# Patient Record
Sex: MALE | Race: BLACK OR AFRICAN AMERICAN | NOT HISPANIC OR LATINO
[De-identification: names, ages, dates, MRNs, and addresses within clinical notes are randomized per-mention and may not be internally consistent; named-entity substitution may affect disease eponyms.]

---

## 2021-01-25 ENCOUNTER — NON-APPOINTMENT (OUTPATIENT)
Age: 51
End: 2021-01-25

## 2021-01-25 ENCOUNTER — APPOINTMENT (OUTPATIENT)
Dept: HEART AND VASCULAR | Facility: CLINIC | Age: 51
End: 2021-01-25
Payer: MEDICARE

## 2021-01-25 VITALS
DIASTOLIC BLOOD PRESSURE: 100 MMHG | WEIGHT: 165 LBS | SYSTOLIC BLOOD PRESSURE: 170 MMHG | HEIGHT: 76 IN | HEART RATE: 97 BPM | BODY MASS INDEX: 20.09 KG/M2

## 2021-01-25 DIAGNOSIS — Z82.49 FAMILY HISTORY OF ISCHEMIC HEART DISEASE AND OTHER DISEASES OF THE CIRCULATORY SYSTEM: ICD-10-CM

## 2021-01-25 PROCEDURE — 93306 TTE W/DOPPLER COMPLETE: CPT

## 2021-01-25 PROCEDURE — 93000 ELECTROCARDIOGRAM COMPLETE: CPT

## 2021-01-25 PROCEDURE — 93880 EXTRACRANIAL BILAT STUDY: CPT

## 2021-01-25 PROCEDURE — 99205 OFFICE O/P NEW HI 60 MIN: CPT

## 2021-01-25 NOTE — PHYSICAL EXAM
[Normal Conjunctiva] : the conjunctiva exhibited no abnormalities [Eyelids - No Xanthelasma] : the eyelids demonstrated no xanthelasmas [Normal Oral Mucosa] : normal oral mucosa [No Oral Pallor] : no oral pallor [No Oral Cyanosis] : no oral cyanosis [Normal Jugular Venous A Waves Present] : normal jugular venous A waves present [Normal Jugular Venous V Waves Present] : normal jugular venous V waves present [No Jugular Venous Good A Waves] : no jugular venous good A waves [Respiration, Rhythm And Depth] : normal respiratory rhythm and effort [Exaggerated Use Of Accessory Muscles For Inspiration] : no accessory muscle use [Auscultation Breath Sounds / Voice Sounds] : lungs were clear to auscultation bilaterally [Shifted Left] : shifted to the left and inferior [Premature Beats] : regular with premature beats [Normal S1] : normal S1 [Normal S2] : normal S2 [S4] : an S4 was heard [III] : a grade 3 [Right Carotid Bruit] : right carotid bruit heard [No Pitting Edema] : no pitting edema present [Left Carotid Bruit] : left carotid bruit heard [Abdomen Soft] : soft [Abdomen Tenderness] : non-tender [] : no hepato-splenomegaly [Abdomen Mass (___ Cm)] : no abdominal mass palpated [Abnormal Walk] : normal gait [Gait - Sufficient For Exercise Testing] : the gait was sufficient for exercise testing [Click] : no click [Distant] : the heart sounds were ~L not distant [Pericardial Rub] : no pericardial rub [FreeTextEntry1] : clotted AVF rue  with ectatic vessels in RUE

## 2021-01-25 NOTE — ASSESSMENT
[FreeTextEntry1] : Assessment \par Patient presents with accelerated hypertension blood pressure 170/100 echocardiogram is notable for depressed LV function with an EF of about 35% with global hypokinesis and moderate LVH.  Moderate diastolic dysfunction is also noted along with moderate mitral regurgitation and moderate pulmonary hypertension he has significant calcifications noted of the aortic leaflets as well as focal calcifications noted on the mitral leaflets.  To optimize his blood pressure Norvasc 5 mg was added to help lower his systolic and diastolic numbers which hopefully will improve LV function.  He is not in any decompensated heart failure based on examination today.  He is cleared to undergo placement of the AV fistula as long-term placement of a catheter in the femoral vein presents significant risk of future infections and risks and benefits outweigh waiting at this point.  I will attempt to find his prior cardiac evaluation to see if there is any ischemic disease patient also might be a candidate for a future defibrillator if indeed this cardiomyopathy is a chronic finding.

## 2021-01-25 NOTE — REVIEW OF SYSTEMS
[Feeling Fatigued] : feeling fatigued [Shortness Of Breath] : shortness of breath [Dyspnea on exertion] : dyspnea during exertion [Negative] : Neurological

## 2021-01-25 NOTE — DISCUSSION/SUMMARY
[Procedure Low Risk] : the procedure risk is low [Patient Intermediate Risk] : the patient is an intermediate risk [Optimized for Surgery] : the patient is optimized for surgery [FreeTextEntry1] : stop eliquis preop 2 days

## 2021-01-25 NOTE — HISTORY OF PRESENT ILLNESS
[Preoperative Visit] : for a medical evaluation prior to surgery [Scheduled Procedure ___] : a [unfilled] [Surgeon Name ___] : surgeon: [unfilled] [Good] : Good [Cardiovascular Disease] : cardiovascular disease [Renal Disease] : renal disease [Prior Anesthesia] : Prior anesthesia [Electrocardiogram] : ~T an ECG ~C was performed [Echocardiogram] : ~T an echocardiogram ~C was performed [Fever] : no fever [Chills] : no chills [Chest Pain] : no chest pain [Diabetes] : no diabetes [Prev Anesthesia Reaction] : no previous anesthesia reaction [de-identified] : Jeevan [FreeTextEntry1] : Patient is a 50-year-old black male with a longstanding hypertensive disease which likely cause renal failure and has been on dialysis for at least 6 years.  Patient had an AV fistula in his right arm which recently clotted off and a catheter was put in the left femoral vein.  He is now preop for AV fistula insertion in the left arm.  Recent cardiac history is notable for admission approximately 1 year ago to [facility] for the patient describes congestive heart failure with difficulty breathing and edema.  He was compliant with dialysis treatment at that time he was placed on aggressive hypertensive meds at that time which appears to have helped him.  He still has some degree of dyspnea with climbing stairs but cannot tolerate walking on flat ground with no overt limitations.\par Patient reports that he did have a cardiac evaluation approximately 2 years ago when he was placed on a transplant list which included a stress test which by his report revealed no blockages.

## 2021-02-08 ENCOUNTER — APPOINTMENT (OUTPATIENT)
Dept: HEART AND VASCULAR | Facility: CLINIC | Age: 51
End: 2021-02-08
Payer: MEDICARE

## 2021-02-08 VITALS
BODY MASS INDEX: 20.09 KG/M2 | HEIGHT: 76 IN | SYSTOLIC BLOOD PRESSURE: 180 MMHG | DIASTOLIC BLOOD PRESSURE: 110 MMHG | WEIGHT: 165 LBS

## 2021-02-08 PROCEDURE — 99214 OFFICE O/P EST MOD 30 MIN: CPT

## 2021-02-08 NOTE — ASSESSMENT
[FreeTextEntry1] : Assessment \par Will optimize BP with higher dose on norvasc \par 10 mg \par will consider MRI angio as pt has prior dye allergy described as anaphylaxis by renal

## 2021-02-08 NOTE — PHYSICAL EXAM
[General Appearance - Well Developed] : well developed [Normal Appearance] : normal appearance [Well Groomed] : well groomed [General Appearance - Well Nourished] : well nourished [No Deformities] : no deformities [General Appearance - In No Acute Distress] : no acute distress [Normal Conjunctiva] : the conjunctiva exhibited no abnormalities [Eyelids - No Xanthelasma] : the eyelids demonstrated no xanthelasmas [Normal Oral Mucosa] : normal oral mucosa [No Oral Pallor] : no oral pallor [No Oral Cyanosis] : no oral cyanosis [Normal Jugular Venous A Waves Present] : normal jugular venous A waves present [Normal Jugular Venous V Waves Present] : normal jugular venous V waves present [No Jugular Venous Good A Waves] : no jugular venous good A waves [Rhythm Regular] : regular [Normal S1] : normal S1 [Normal S2] : normal S2 [S4] : an S4 was heard [Click] : no click [Distant] : the heart sounds were ~L not distant

## 2021-02-08 NOTE — HISTORY OF PRESENT ILLNESS
[FreeTextEntry1] : AVF psotponed due to weather \par Had prior nuclear stress in 2019 \par segmental ischemia \par Was placed on med sfor chf and bp and ICD deferred as EF improved \par Still has elev BP on non HD days

## 2021-03-08 ENCOUNTER — NON-APPOINTMENT (OUTPATIENT)
Age: 51
End: 2021-03-08

## 2021-03-08 ENCOUNTER — APPOINTMENT (OUTPATIENT)
Dept: HEART AND VASCULAR | Facility: CLINIC | Age: 51
End: 2021-03-08
Payer: MEDICARE

## 2021-03-08 VITALS
HEIGHT: 76 IN | BODY MASS INDEX: 20.09 KG/M2 | SYSTOLIC BLOOD PRESSURE: 170 MMHG | DIASTOLIC BLOOD PRESSURE: 90 MMHG | WEIGHT: 165 LBS | HEART RATE: 77 BPM

## 2021-03-08 PROCEDURE — 99215 OFFICE O/P EST HI 40 MIN: CPT

## 2021-03-08 PROCEDURE — 93000 ELECTROCARDIOGRAM COMPLETE: CPT

## 2021-03-08 NOTE — HISTORY OF PRESENT ILLNESS
[FreeTextEntry1] : AVF placed by Jeevan \par Had prior nuclear stress in 2019 \par segmental ischemia \par Was placed on med for chf and bp and ICD deferred as EF improved \par Still has elev BP on non HD days \par feels well with no sscp or sob \par Has scant UO

## 2021-03-08 NOTE — PHYSICAL EXAM
[General Appearance - Well Developed] : well developed [Normal Appearance] : normal appearance [Well Groomed] : well groomed [General Appearance - Well Nourished] : well nourished [No Deformities] : no deformities [General Appearance - In No Acute Distress] : no acute distress [Normal Conjunctiva] : the conjunctiva exhibited no abnormalities [Eyelids - No Xanthelasma] : the eyelids demonstrated no xanthelasmas [Normal Oral Mucosa] : normal oral mucosa [No Oral Pallor] : no oral pallor [No Oral Cyanosis] : no oral cyanosis [Normal Jugular Venous A Waves Present] : normal jugular venous A waves present [Normal Jugular Venous V Waves Present] : normal jugular venous V waves present [No Jugular Venous Good A Waves] : no jugular venous good A waves [FreeTextEntry1] : pos thrill LUE  [Rhythm Regular] : regular [Normal S1] : normal S1 [Normal S2] : normal S2 [S4] : an S4 was heard [Click] : no click

## 2021-03-08 NOTE — ASSESSMENT
[FreeTextEntry1] : Assessment \par Bp improved on meds \par Has Cardiomyopathywhich based on prior ETT NUCLEAR is ischemic in nature \par Pt had dye reaction in past and will be in touch with Dr Horton about doing a cath \par Bp remains stable \par risk benefits explained

## 2021-04-12 ENCOUNTER — APPOINTMENT (OUTPATIENT)
Dept: HEART AND VASCULAR | Facility: CLINIC | Age: 51
End: 2021-04-12
Payer: MEDICARE

## 2021-04-12 ENCOUNTER — NON-APPOINTMENT (OUTPATIENT)
Age: 51
End: 2021-04-12

## 2021-04-12 VITALS
WEIGHT: 158 LBS | BODY MASS INDEX: 19.24 KG/M2 | DIASTOLIC BLOOD PRESSURE: 80 MMHG | HEIGHT: 76 IN | SYSTOLIC BLOOD PRESSURE: 170 MMHG | HEART RATE: 71 BPM

## 2021-04-12 PROCEDURE — 93000 ELECTROCARDIOGRAM COMPLETE: CPT

## 2021-04-12 PROCEDURE — 99213 OFFICE O/P EST LOW 20 MIN: CPT

## 2021-04-12 NOTE — HISTORY OF PRESENT ILLNESS
[FreeTextEntry1] : AVF placed by Jeevan \par Getting HD via Fistula \par No sscp during HD \par Has Bp drop on HD to 130/80 \par No dizziness

## 2021-07-12 ENCOUNTER — APPOINTMENT (OUTPATIENT)
Dept: HEART AND VASCULAR | Facility: CLINIC | Age: 51
End: 2021-07-12
Payer: MEDICARE

## 2021-07-12 ENCOUNTER — NON-APPOINTMENT (OUTPATIENT)
Age: 51
End: 2021-07-12

## 2021-07-12 VITALS
HEIGHT: 76 IN | SYSTOLIC BLOOD PRESSURE: 140 MMHG | WEIGHT: 157 LBS | HEART RATE: 75 BPM | BODY MASS INDEX: 19.12 KG/M2 | DIASTOLIC BLOOD PRESSURE: 90 MMHG

## 2021-07-12 VITALS — SYSTOLIC BLOOD PRESSURE: 150 MMHG | DIASTOLIC BLOOD PRESSURE: 90 MMHG

## 2021-07-12 PROCEDURE — 99214 OFFICE O/P EST MOD 30 MIN: CPT

## 2021-07-12 PROCEDURE — 93000 ELECTROCARDIOGRAM COMPLETE: CPT

## 2021-07-12 NOTE — HISTORY OF PRESENT ILLNESS
[Preoperative Visit] : for a medical evaluation prior to surgery [Scheduled Procedure ___] : a [unfilled] [Surgeon Name ___] : surgeon: [unfilled] [de-identified] : Dr Chew [FreeTextEntry1] : Patient is a 51-year-old male with end-stage renal disease currently on dialysis treatment patient also has very underlying cardiomyopathy for which he been followed in this office he is currently preop for a right and left arm fistula plasty due to impaired function of his fistula.  He currently tolerates fairly good effort tolerance able to walk 20 to 30minutes with no limitations.  He has no symptoms of orthopnea no unexplained headaches appetite is good.

## 2021-07-12 NOTE — REVIEW OF SYSTEMS
[Fever] : no fever [Headache] : no headache [Weight Gain (___ Lbs)] : no recent weight gain [Feeling Fatigued] : not feeling fatigued [Weight Loss (___ Lbs)] : no recent weight loss [SOB] : no shortness of breath [Leg Claudication] : no intermittent leg claudication [Negative] : Gastrointestinal

## 2021-07-12 NOTE — PHYSICAL EXAM
[Normal Conjunctiva] : the conjunctiva exhibited no abnormalities [Normal Oral Mucosa] : normal oral mucosa [Normal Jugular Venous A Waves Present] : normal jugular venous A waves present [Normal Jugular Venous V Waves Present] : normal jugular venous V waves present [Respiration, Rhythm And Depth] : normal respiratory rhythm and effort [Exaggerated Use Of Accessory Muscles For Inspiration] : no accessory muscle use [Normal Rate] : normal [Rhythm Regular] : regular [Normal S2] : normal S2 [S3] : an S3 was heard [Click] : no click [Distant] : the heart sounds were ~L not distant [II] : a grade 2 [Right Carotid Bruit] : right carotid bruit heard [Left Carotid Bruit] : left carotid bruit heard [FreeTextEntry1] : pos thrill left arm . vasc deformities deformities bilat UE  [Skin Color & Pigmentation] : normal skin color and pigmentation [] : no rash [No Venous Stasis] : no venous stasis [Skin Lesions] : no skin lesions [No Skin Ulcers] : no skin ulcer [No Xanthoma] : no  xanthoma was observed

## 2021-09-13 ENCOUNTER — NON-APPOINTMENT (OUTPATIENT)
Age: 51
End: 2021-09-13

## 2021-09-13 ENCOUNTER — APPOINTMENT (OUTPATIENT)
Dept: HEART AND VASCULAR | Facility: CLINIC | Age: 51
End: 2021-09-13
Payer: MEDICARE

## 2021-09-13 VITALS — HEIGHT: 76 IN | WEIGHT: 159.56 LBS | BODY MASS INDEX: 19.43 KG/M2 | HEART RATE: 75 BPM

## 2021-09-13 PROCEDURE — 99214 OFFICE O/P EST MOD 30 MIN: CPT

## 2021-09-13 PROCEDURE — 93000 ELECTROCARDIOGRAM COMPLETE: CPT

## 2021-09-13 NOTE — PHYSICAL EXAM
[5th Left ICS - MCL] : palpated at the 5th LICS in the midclavicular line [Normal] : normal [Normal Rate] : normal [Rhythm Regular] : regular [Normal S1] : normal S1 [Normal S2] : normal S2 [S4] : an S4 was heard [II] : a grade 2 [Right Carotid Bruit] : right carotid bruit heard [Left Carotid Bruit] : left carotid bruit heard [de-identified] : Pos thrill LUE

## 2021-09-13 NOTE — HISTORY OF PRESENT ILLNESS
[FreeTextEntry1] : walks up 3 flights of steps limited by VERNON \par no edema no sob \par Tolerating HD TIW \par Periodic dizziness after eating meaks no arrhythmias or near syncope \par Fistula in left arm functional

## 2021-09-13 NOTE — REVIEW OF SYSTEMS
[SOB] : shortness of breath [Negative] : Genitourinary [Fever] : no fever [Chills] : no chills [Leg Claudication] : no intermittent leg claudication [Syncope] : no syncope [Cough] : no cough

## 2021-09-13 NOTE — ASSESSMENT
[FreeTextEntry1] : CHF class II \par No edema on exam Bpstable on HD TIW \par Had abnl MIBI in 2019 and will f/u as he is on renal tx list \par regular exercise and diet reviewed

## 2021-10-06 ENCOUNTER — APPOINTMENT (OUTPATIENT)
Dept: HEART AND VASCULAR | Facility: CLINIC | Age: 51
End: 2021-10-06

## 2021-10-20 ENCOUNTER — APPOINTMENT (OUTPATIENT)
Dept: HEART AND VASCULAR | Facility: CLINIC | Age: 51
End: 2021-10-20
Payer: MEDICARE

## 2021-10-20 PROCEDURE — 96374 THER/PROPH/DIAG INJ IV PUSH: CPT | Mod: 59

## 2021-10-20 PROCEDURE — A9500: CPT

## 2021-10-20 PROCEDURE — 78452 HT MUSCLE IMAGE SPECT MULT: CPT

## 2021-10-20 PROCEDURE — 93015 CV STRESS TEST SUPVJ I&R: CPT

## 2022-02-14 ENCOUNTER — APPOINTMENT (OUTPATIENT)
Dept: HEART AND VASCULAR | Facility: CLINIC | Age: 52
End: 2022-02-14
Payer: MEDICARE

## 2022-02-14 ENCOUNTER — NON-APPOINTMENT (OUTPATIENT)
Age: 52
End: 2022-02-14

## 2022-02-14 VITALS — SYSTOLIC BLOOD PRESSURE: 125 MMHG | DIASTOLIC BLOOD PRESSURE: 70 MMHG

## 2022-02-14 PROCEDURE — 99214 OFFICE O/P EST MOD 30 MIN: CPT

## 2022-02-14 PROCEDURE — 93880 EXTRACRANIAL BILAT STUDY: CPT

## 2022-02-14 PROCEDURE — 93306 TTE W/DOPPLER COMPLETE: CPT

## 2022-02-14 PROCEDURE — 93000 ELECTROCARDIOGRAM COMPLETE: CPT

## 2022-02-14 NOTE — ASSESSMENT
[FreeTextEntry1] : CHF class II \par No edema on exam Bp stable on HD TIW \par Had abnl MIBI in 2019 and will f/u as he is on renal tx list \par regular exercise and diet reviewed \par Prior Nuclear mod scarring \par Ef 20%\par echo Lvef 20% mod MR poorly mobile PL MV \par Spontaneous echo contras \par Mild plaque in carotids \par pt is candidate for ICD \par risk benefits discussed \par Has declined in apst \par will speka to renal to discuss

## 2022-02-14 NOTE — PHYSICAL EXAM
[Shifted Left] : shifted to the left and inferior [Normal] : normal [Apical Thrill] : a thrill was present at the apex [Normal Rate] : normal [Rhythm Regular] : regular [Normal S1] : normal S1 [Normal S2] : normal S2 [S4] : an S4 was heard [II] : a grade 2 [I] : a grade 1 [Left Carotid Bruit] : left carotid bruit heard [Right Carotid Bruit] : right carotid bruit heard [de-identified] : Pos thrill LUE

## 2022-04-25 ENCOUNTER — RX RENEWAL (OUTPATIENT)
Age: 52
End: 2022-04-25

## 2022-05-09 ENCOUNTER — APPOINTMENT (OUTPATIENT)
Dept: HEART AND VASCULAR | Facility: CLINIC | Age: 52
End: 2022-05-09

## 2022-06-08 ENCOUNTER — NON-APPOINTMENT (OUTPATIENT)
Age: 52
End: 2022-06-08

## 2022-06-08 ENCOUNTER — APPOINTMENT (OUTPATIENT)
Dept: HEART AND VASCULAR | Facility: CLINIC | Age: 52
End: 2022-06-08
Payer: MEDICARE

## 2022-06-08 VITALS
HEART RATE: 78 BPM | SYSTOLIC BLOOD PRESSURE: 140 MMHG | BODY MASS INDEX: 19.48 KG/M2 | DIASTOLIC BLOOD PRESSURE: 85 MMHG | WEIGHT: 160 LBS | RESPIRATION RATE: 12 BRPM | HEIGHT: 76 IN

## 2022-06-08 PROCEDURE — 93000 ELECTROCARDIOGRAM COMPLETE: CPT

## 2022-06-08 PROCEDURE — 99214 OFFICE O/P EST MOD 30 MIN: CPT

## 2022-06-08 RX ORDER — APIXABAN 2.5 MG/1
2.5 TABLET, FILM COATED ORAL
Qty: 60 | Refills: 3 | Status: DISCONTINUED | COMMUNITY
End: 2022-06-08

## 2022-06-08 NOTE — HISTORY OF PRESENT ILLNESS
[Preoperative Visit] : for a medical evaluation prior to surgery [Scheduled Procedure ___] : a [unfilled] [Surgeon Name ___] : surgeon: [unfilled] [Dyspnea] : dyspnea [Cardiovascular Disease] : cardiovascular disease [Renal Disease] : renal disease [Prior Anesthesia] : Prior anesthesia [Electrocardiogram] : ~T an ECG ~C was performed [Echocardiogram] : ~T an echocardiogram ~C was performed [Metabolic Capacity ___Mets%] : The patient has a metabolic capacity of [unfilled] Mets%  [Fever] : no fever [Chills] : no chills [Fatigue] : no fatigue [Chest Pain] : no chest pain [Urinary Frequency] : no urinary frequency [Nausea] : no nausea [Diarrhea] : no diarrhea [Abdominal Pain] : no abdominal pain [GI Disease] : no gastrointestinal disease [Sleep Apnea] : no sleep apnea [Prev Anesthesia Reaction] : no previous anesthesia reaction [de-identified] : Dr Chew  [FreeTextEntry1] : Patient is well-known to me he is 52 years old currently on dialysis for end-stage renal disease he was also diagnosed with known cardiomyopathy prior perfusion studies abnl ? ischemic etiology  catheterization had not been performed due to history of dye allergy patient in the past has declined the use of a defibrillator.  Patient tolerates dialysis treatments fairly well he does note some degree of dyspnea when carrying bags upstairs requiring him to rest is no signs of peripheral edema he is currently preop for an AV fistula balloon repair in the left arm he is currently getting dialyzed via a right Shiley catheter.

## 2022-06-08 NOTE — REVIEW OF SYSTEMS
[SOB] : shortness of breath [Dyspnea on exertion] : dyspnea during exertion [Chest Discomfort] : no chest discomfort [Lower Ext Edema] : no extremity edema [Palpitations] : no palpitations [Orthopnea] : no orthopnea [Cough] : no cough [Wheezing] : no wheezing

## 2022-06-08 NOTE — PHYSICAL EXAM
[Normal Conjunctiva] : the conjunctiva exhibited no abnormalities [Normal Oral Mucosa] : normal oral mucosa [] : no respiratory distress [Exaggerated Use Of Accessory Muscles For Inspiration] : no accessory muscle use [Normal] : normal [Rhythm Regular] : regular [Normal S1] : normal S1 [Normal S2] : normal S2 [Click] : a ~M click was heard [III] : a grade 3 [Oriented To Time, Place, And Person] : oriented to person, place, and time [Impaired Insight] : insight and judgment were intact [Distant] : the heart sounds were ~L not distant [Right Carotid Bruit] : no bruit heard over the right carotid [Left Carotid Bruit] : no bruit heard over the left carotid [FreeTextEntry1] : avf fistula PAT barrett in right side in situ

## 2022-06-08 NOTE — DISCUSSION/SUMMARY
[Procedure Low Risk] : the procedure risk is low [Patient Low Risk] : the patient is a low surgical risk [Optimized for Surgery] : the patient is optimized for surgery [FreeTextEntry1] : was given elioquis by vas will hold as per vasc instructions

## 2022-10-31 ENCOUNTER — APPOINTMENT (OUTPATIENT)
Dept: HEART AND VASCULAR | Facility: CLINIC | Age: 52
End: 2022-10-31
Payer: MEDICARE

## 2022-10-31 ENCOUNTER — NON-APPOINTMENT (OUTPATIENT)
Age: 52
End: 2022-10-31

## 2022-10-31 VITALS
WEIGHT: 160 LBS | BODY MASS INDEX: 19.48 KG/M2 | DIASTOLIC BLOOD PRESSURE: 80 MMHG | HEIGHT: 76 IN | SYSTOLIC BLOOD PRESSURE: 130 MMHG | HEART RATE: 84 BPM | RESPIRATION RATE: 12 BRPM

## 2022-10-31 DIAGNOSIS — I49.9 CARDIAC ARRHYTHMIA, UNSPECIFIED: ICD-10-CM

## 2022-10-31 PROCEDURE — 93000 ELECTROCARDIOGRAM COMPLETE: CPT

## 2022-10-31 PROCEDURE — 99214 OFFICE O/P EST MOD 30 MIN: CPT | Mod: 25

## 2022-10-31 NOTE — HISTORY OF PRESENT ILLNESS
[FreeTextEntry1] : Patient is a 52-year-old male with a end-stage cardiomyopathy currently on hemodialysis for end-stage renal disease.  Approximately 2 months ago patient has noted nocturnal episodes of palpitations lasting typically less than a minute associated with some mild dizziness.  Patient does note that after finishing dialysis his heart rate is typically on the low side usually 35-45 with some symptoms of lightheadedness and dizziness.  He denies any current symptoms of orthopnea PND or leg edema.

## 2022-10-31 NOTE — ASSESSMENT
[FreeTextEntry1] : Arrhythmias\par Wiil increase BB to 25 mg bid of coreeg \par lower CCB norvasc to 5mg \par qd\par Holter placed

## 2022-10-31 NOTE — DISCUSSION/SUMMARY
[Rhythm Disorder] : rhythm disorder [Not Responding to Treatment] : not responding to treatment [Holter Monitor] : a Holter monitor [Increase] : increasing beta blockers [EKG obtained to assist in diagnosis and management of assessed problem(s)] : EKG obtained to assist in diagnosis and management of assessed problem(s)

## 2022-10-31 NOTE — PHYSICAL EXAM
[Frail] : frail [Elevated JVD ____cm] : elevated JVD ~Vcm [No Edema] : no edema [No Cyanosis] : no cyanosis [de-identified] : apical 4/6 sys murmur to axilla  [de-identified] : Left avf w thrill shihuy in situ

## 2022-11-07 ENCOUNTER — APPOINTMENT (OUTPATIENT)
Dept: HEART AND VASCULAR | Facility: CLINIC | Age: 52
End: 2022-11-07

## 2022-11-07 PROCEDURE — 99213 OFFICE O/P EST LOW 20 MIN: CPT

## 2022-11-14 NOTE — HISTORY OF PRESENT ILLNESS
[FreeTextEntry1] : Patient is a 52-year-old black male with end-stage renal disease currently on hemodialysis via Shiley catheter who has a known history of a cardiomyopathy based on prior echoes and stress imaging.  Patient has reported recent episodes of near syncope after dialysis.  Holter monitoring was placed and revealed a high degree AV block especially in the morning hours with prolonged AV block lasting greater than 7.5 seconds they were's intermittent episodes of none sustained V. tach as well as SVT noted these episodes occur during the morning hours and the patient was presumably sleeping and had no symptoms of dizziness or syncope.  Patient had been urged in the past to get a defibrillator for his reduced LV function but has declined.

## 2022-11-14 NOTE — DISCUSSION/SUMMARY
[Cardiomyopathy] : cardiomyopathy [ICD] : an implantable cardioverter-defibrillator [Pacemaker] : a pacemaker [Patient] : the patient

## 2022-11-14 NOTE — ASSESSMENT
[FreeTextEntry1] : Has high degee AVB on holet > 7 sec pauses \par Will refer for ICD PM w Known EF < 30

## 2022-11-14 NOTE — REVIEW OF SYSTEMS
[Headache] : no headache [Weight Gain (___ Lbs)] : no recent weight gain [Feeling Fatigued] : feeling fatigued [Weight Loss (___ Lbs)] : no recent weight loss [SOB] : no shortness of breath

## 2022-11-14 NOTE — PHYSICAL EXAM
[5th Left ICS - MCL] : palpated at the 5th LICS in the midclavicular line [Rhythm Regular] : regular [Normal S1] : normal S1 [Normal S2] : normal S2 [II] : a grade 2 [Normal] : soft, non-tender, no masses/organomegaly, normal bowel sounds [de-identified] : LUE / JANESSA mod varicosities bilat

## 2022-12-16 ENCOUNTER — NON-APPOINTMENT (OUTPATIENT)
Age: 52
End: 2022-12-16

## 2022-12-16 ENCOUNTER — APPOINTMENT (OUTPATIENT)
Dept: HEART AND VASCULAR | Facility: CLINIC | Age: 52
End: 2022-12-16

## 2022-12-16 VITALS
BODY MASS INDEX: 18.63 KG/M2 | HEIGHT: 76 IN | SYSTOLIC BLOOD PRESSURE: 148 MMHG | WEIGHT: 153 LBS | HEART RATE: 74 BPM | DIASTOLIC BLOOD PRESSURE: 90 MMHG

## 2022-12-16 PROCEDURE — 93000 ELECTROCARDIOGRAM COMPLETE: CPT

## 2022-12-16 PROCEDURE — 99214 OFFICE O/P EST MOD 30 MIN: CPT | Mod: 25

## 2022-12-16 RX ORDER — ENALAPRIL MALEATE 20 MG/1
20 TABLET ORAL DAILY
Qty: 90 | Refills: 0 | Status: DISCONTINUED | COMMUNITY
End: 2022-12-16

## 2022-12-16 NOTE — PHYSICAL EXAM
[Well Developed] : well developed [Well Nourished] : well nourished [No Acute Distress] : no acute distress [Normal Conjunctiva] : normal conjunctiva [Normal Venous Pressure] : normal venous pressure [No Carotid Bruit] : no carotid bruit [Clear Lung Fields] : clear lung fields [Good Air Entry] : good air entry [No Respiratory Distress] : no respiratory distress  [Soft] : abdomen soft [Non Tender] : non-tender [No Masses/organomegaly] : no masses/organomegaly [Normal Bowel Sounds] : normal bowel sounds [Normal Gait] : normal gait [No Rash] : no rash [No Skin Lesions] : no skin lesions [Moves all extremities] : moves all extremities [No Focal Deficits] : no focal deficits [Normal Speech] : normal speech [Alert and Oriented] : alert and oriented [Normal memory] : normal memory [de-identified] : Cardiovascular: The PMI was palpated at the 5th LICS in the midclavicular line. The apical impulse was normal. The rhythm was regular. Heart sounds: normal S1, normal S2. \par A grade 2 systolic murmur was heard at the LLSB. [de-identified] : LUE / RUE mod varicosities bilat.

## 2022-12-16 NOTE — ASSESSMENT
[FreeTextEntry1] : Third degree atrioventricular block (426.0) (I44.2)\par CHF (congestive heart failure) (428.0) (I50.9)\par \par Has high degee AVB on holet > 7 sec pauses \par Will refer for ICD PM w Known EF < 30. \par Will get ICD (leadless and micra PPM) \par has dyspnea w walking will change ACE for entresto bid

## 2022-12-16 NOTE — HISTORY OF PRESENT ILLNESS
[FreeTextEntry1] : 11/7/22\par Patient is a 52-year-old black male with end-stage renal disease currently on hemodialysis via Shiley catheter who has a known history of a cardiomyopathy based on prior echoes and stress imaging. Patient has reported recent episodes of near syncope after dialysis. Holter monitoring was placed and revealed a high degree AV block especially in the morning hours with prolonged AV block lasting greater than 7.5 seconds they were's intermittent episodes of none sustained V. tach as well as SVT noted these episodes occur during the morning hours and the patient was presumably sleeping and had no symptoms of dizziness or syncope. Patient had been urged in the past to get a defibrillator for his reduced LV function but has declined. \par 12/16/22\par The patient c/o SOB for the past 3 to 4 days, described as mild in severity, worsening with exertion and relieved with rest. The patient denies associated chest pain, orthopnea, palpitations, leg edema, dizziness, diaphoresis, PND. The patient considers this a change in their clinical condition/status.\par seen  by EPS will get leadless ICD followed by micra ppm in RV

## 2022-12-16 NOTE — DISCUSSION/SUMMARY
[Cardiomyopathy] : cardiomyopathy [ECG] : ECG [Continue] : continuing angiotensin receptor blockers [ICD] : an implantable cardioverter-defibrillator [Pacemaker] : a pacemaker [Patient] : the patient [de-identified] : class II  [de-identified] : entresto added instaed o ACE  [FreeTextEntry1] : Cardiomyopathy: The impression is cardiomyopathy. Other planned treatment includes an implantable cardioverter-defibrillator and a pacemaker. The plan was discussed with the patient.  [EKG obtained to assist in diagnosis and management of assessed problem(s)] : EKG obtained to assist in diagnosis and management of assessed problem(s)

## 2022-12-16 NOTE — ADDENDUM
[FreeTextEntry1] : I, Chris Elmore, assisted in documentation on 12/16/2022 acting as a scribe for Dr. Enrico Cortez.\par \par

## 2022-12-16 NOTE — REVIEW OF SYSTEMS
[Feeling Fatigued] : feeling fatigued [Negative] : Heme/Lymph [Headache] : no headache [Weight Gain (___ Lbs)] : no recent weight gain [Weight Loss (___ Lbs)] : no recent weight loss

## 2023-01-27 ENCOUNTER — APPOINTMENT (OUTPATIENT)
Dept: HEART AND VASCULAR | Facility: CLINIC | Age: 53
End: 2023-01-27
Payer: MEDICARE

## 2023-01-27 ENCOUNTER — NON-APPOINTMENT (OUTPATIENT)
Age: 53
End: 2023-01-27

## 2023-01-27 VITALS
BODY MASS INDEX: 18.02 KG/M2 | RESPIRATION RATE: 12 BRPM | HEIGHT: 76 IN | HEART RATE: 63 BPM | SYSTOLIC BLOOD PRESSURE: 121 MMHG | DIASTOLIC BLOOD PRESSURE: 86 MMHG | WEIGHT: 148 LBS

## 2023-01-27 PROCEDURE — 99214 OFFICE O/P EST MOD 30 MIN: CPT | Mod: 25

## 2023-01-27 PROCEDURE — 93000 ELECTROCARDIOGRAM COMPLETE: CPT

## 2023-01-27 NOTE — PHYSICAL EXAM
[Well Developed] : well developed [Well Nourished] : well nourished [No Acute Distress] : no acute distress [Normal Conjunctiva] : normal conjunctiva [Normal Venous Pressure] : normal venous pressure [No Carotid Bruit] : no carotid bruit [Clear Lung Fields] : clear lung fields [Good Air Entry] : good air entry [No Respiratory Distress] : no respiratory distress  [Soft] : abdomen soft [Non Tender] : non-tender [No Masses/organomegaly] : no masses/organomegaly [Normal Bowel Sounds] : normal bowel sounds [Normal Gait] : normal gait [No Rash] : no rash [No Skin Lesions] : no skin lesions [Moves all extremities] : moves all extremities [No Focal Deficits] : no focal deficits [Normal Speech] : normal speech [Alert and Oriented] : alert and oriented [Normal memory] : normal memory [de-identified] : ICD in left flank w wire lead noted  [de-identified] : . Cardiovascular: The PMI was palpated at the 5th LICS in the midclavicular line. The apical impulse was normal. The rhythm was regular. Heart sounds: normal S1, normal S2.  [de-identified] : LUE / RUE mod varicosities bilat.  [de-identified] : SPIKEE / JANESSA mod varicosities bilat.

## 2023-01-27 NOTE — REVIEW OF SYSTEMS
[Headache] : no headache [Feeling Fatigued] : not feeling fatigued [Weight Loss (___ Lbs)] : [unfilled] ~Ulb weight loss [SOB] : no shortness of breath [Leg Claudication] : no intermittent leg claudication [Syncope] : no syncope [Negative] : ENT

## 2023-01-27 NOTE — HISTORY OF PRESENT ILLNESS
[FreeTextEntry1] : 11/7/22\par Patient is a 52-year-old black male with end-stage renal disease currently on hemodialysis via Shiley catheter who has a known history of a cardiomyopathy based on prior echoes and stress imaging. Patient has reported recent episodes of near syncope after dialysis. Holter monitoring was placed and revealed a high degree AV block especially in the morning hours with prolonged AV block lasting greater than 7.5 seconds they were's intermittent episodes of none sustained V. tach as well as SVT noted these episodes occur during the morning hours and the patient was presumably sleeping and had no symptoms of dizziness or syncope. Patient had been urged in the past to get a defibrillator for his reduced LV function but has declined. \par 12/16/22\par The patient c/o SOB for the past 3 to 4 days, described as mild in severity, worsening with exertion and relieved with rest. The patient denies associated chest pain, orthopnea, palpitations, leg edema, dizziness, diaphoresis, PND. The patient considers this a change in their clinical condition/status.\par seen by EPS will get leadless ICD followed by micra ppm in RV \par 1/27/23\par Denies Chest Pain, SOB, Dizziness, Leg edema, Orthopnea, PND, Palpitations, Syncope, VERNON, Diaphoresis.\par s/p ICD no chf on exam

## 2023-01-27 NOTE — ADDENDUM
[FreeTextEntry1] : I, Chris Elmore, assisted in documentation on 01/27/2023 acting as a scribe for Dr. Enrico Cortez.\par \par

## 2023-01-27 NOTE — ASSESSMENT
[FreeTextEntry1] : Dialysis patient (V45.11) (Z99.2)\par Hypertension, renal disease (403.90,585.9) (I12.9)\par Third degree atrioventricular block (426.0) (I44.2)\par CHF (congestive heart failure) (428.0) (I50.9)\par \par Third degree atrioventricular block (426.0) (I44.2)\par CHF (congestive heart failure) (428.0) (I50.9)\par s/p ICD doing well\par \par

## 2023-01-27 NOTE — DISCUSSION/SUMMARY
[EKG obtained to assist in diagnosis and management of assessed problem(s)] : EKG obtained to assist in diagnosis and management of assessed problem(s) [FreeTextEntry1] : Cardiomyopathy: The impression is cardiomyopathy. Currently, the condition is class II. The diagnostic plan includes ECG.entresto added instaed o ACE. Other planned treatment includes an implantable cardioverter-defibrillator and a pacemaker. The plan was discussed with the patient. \par Cardiomyopathy: The impression is cardiomyopathy. Other planned treatment includes an implantable cardioverter-defibrillator and a pacemaker. The plan was discussed with the patient.

## 2023-04-14 ENCOUNTER — APPOINTMENT (OUTPATIENT)
Dept: HEART AND VASCULAR | Facility: CLINIC | Age: 53
End: 2023-04-14
Payer: MEDICARE

## 2023-04-14 ENCOUNTER — NON-APPOINTMENT (OUTPATIENT)
Age: 53
End: 2023-04-14

## 2023-04-14 VITALS — DIASTOLIC BLOOD PRESSURE: 90 MMHG | SYSTOLIC BLOOD PRESSURE: 126 MMHG

## 2023-04-14 VITALS — HEIGHT: 76 IN | WEIGHT: 157 LBS | BODY MASS INDEX: 19.12 KG/M2

## 2023-04-14 DIAGNOSIS — R09.89 OTHER SPECIFIED SYMPTOMS AND SIGNS INVOLVING THE CIRCULATORY AND RESPIRATORY SYSTEMS: ICD-10-CM

## 2023-04-14 PROCEDURE — 93000 ELECTROCARDIOGRAM COMPLETE: CPT

## 2023-04-14 PROCEDURE — 99214 OFFICE O/P EST MOD 30 MIN: CPT | Mod: 25

## 2023-04-14 RX ORDER — SACUBITRIL AND VALSARTAN 24; 26 MG/1; MG/1
24-26 TABLET, FILM COATED ORAL
Qty: 60 | Refills: 4 | Status: DISCONTINUED | COMMUNITY
Start: 2022-12-16 | End: 2023-04-14

## 2023-04-14 NOTE — ADDENDUM
[FreeTextEntry1] : I, Chris Elmore, assisted in documentation on 04/14/2023 acting as a scribe for Dr. Enrico Cortez.\par \par

## 2023-04-14 NOTE — ASSESSMENT
[FreeTextEntry1] : Hypertension, renal disease (403.90,585.9) (I12.9)\par CHF (congestive heart failure) (428.0) (I50.9)\par Cardiomyopathy in disease classified elsewhere (425.8) (I43)\par \par Dialysis patient (V45.11) (Z99.2)\par Hypertension, renal disease (403.90,585.9) (I12.9)\par Third degree atrioventricular block (426.0) (I44.2)\par CHF (congestive heart failure) (428.0) (I50.9)\par \par Third degree atrioventricular block (426.0) (I44.2)\par CHF (congestive heart failure) (428.0) (I50.9)\par s/p ICD doing well\par \par . \par

## 2023-04-14 NOTE — PHYSICAL EXAM
[Well Developed] : well developed [Well Nourished] : well nourished [No Acute Distress] : no acute distress [Normal Conjunctiva] : normal conjunctiva [Normal Venous Pressure] : normal venous pressure [No Carotid Bruit] : no carotid bruit [Clear Lung Fields] : clear lung fields [Good Air Entry] : good air entry [No Respiratory Distress] : no respiratory distress  [Soft] : abdomen soft [Non Tender] : non-tender [No Masses/organomegaly] : no masses/organomegaly [Normal Bowel Sounds] : normal bowel sounds [Normal Gait] : normal gait [No Rash] : no rash [No Skin Lesions] : no skin lesions [Moves all extremities] : moves all extremities [No Focal Deficits] : no focal deficits [Normal Speech] : normal speech [Alert and Oriented] : alert and oriented [Normal memory] : normal memory [de-identified] : ICD in left flank w wire lead noted . . Cardiovascular: The PMI was palpated at the 5th LICS in the midclavicular line. The apical impulse was normal. The rhythm was regular. Heart sounds: normal S1, normal S2. [de-identified] : LUE / RUE mod varicosities bilat.

## 2023-04-14 NOTE — HISTORY OF PRESENT ILLNESS
[FreeTextEntry1] : 11/7/22\par Patient is a 52-year-old black male with end-stage renal disease currently on hemodialysis via Shiley catheter who has a known history of a cardiomyopathy based on prior echoes and stress imaging. Patient has reported recent episodes of near syncope after dialysis. Holter monitoring was placed and revealed a high degree AV block especially in the morning hours with prolonged AV block lasting greater than 7.5 seconds they were's intermittent episodes of none sustained V. tach as well as SVT noted these episodes occur during the morning hours and the patient was presumably sleeping and had no symptoms of dizziness or syncope. Patient had been urged in the past to get a defibrillator for his reduced LV function but has declined. \par 12/16/22\par The patient c/o SOB for the past 3 to 4 days, described as mild in severity, worsening with exertion and relieved with rest. The patient denies associated chest pain, orthopnea, palpitations, leg edema, dizziness, diaphoresis, PND. The patient considers this a change in their clinical condition/status.\par seen by EPS will get leadless ICD followed by micra ppm in RV \par 1/27/23\par Denies Chest Pain, SOB, Dizziness, Leg edema, Orthopnea, PND, Palpitations, Syncope, VERNON, Diaphoresis.\par s/p ICD no chf on exam \par 04/14/23\par Denies Chest Pain, SOB, Dizziness, Leg edema, Orthopnea, PND, Palpitations, Syncope, VERNON, Diaphoresis.\par awaiting Micra PPM in RV \par bp after /80 \par has sob after 3 flights of steps no edema

## 2023-04-14 NOTE — REVIEW OF SYSTEMS
[Weight Loss (___ Lbs)] : [unfilled] ~Ulb weight loss [Negative] : ENT [Headache] : no headache [Feeling Fatigued] : not feeling fatigued [SOB] : no shortness of breath [Leg Claudication] : no intermittent leg claudication [Syncope] : no syncope

## 2023-04-14 NOTE — DISCUSSION/SUMMARY
[EKG obtained to assist in diagnosis and management of assessed problem(s)] : EKG obtained to assist in diagnosis and management of assessed problem(s) [Cardiomyopathy] : cardiomyopathy [Responding to Treatment] : responding to treatment [ECG] : ECG [Echocardiogram] : echocardiogram [Continue] : continuing beta blockers [Increase] : increasing angiotensin receptor blockers

## 2023-05-08 ENCOUNTER — APPOINTMENT (OUTPATIENT)
Dept: HEART AND VASCULAR | Facility: CLINIC | Age: 53
End: 2023-05-08

## 2023-06-07 ENCOUNTER — APPOINTMENT (OUTPATIENT)
Dept: HEART AND VASCULAR | Facility: CLINIC | Age: 53
End: 2023-06-07
Payer: MEDICARE

## 2023-06-07 ENCOUNTER — NON-APPOINTMENT (OUTPATIENT)
Age: 53
End: 2023-06-07

## 2023-06-07 VITALS
WEIGHT: 143 LBS | BODY MASS INDEX: 17.41 KG/M2 | HEART RATE: 81 BPM | SYSTOLIC BLOOD PRESSURE: 130 MMHG | HEIGHT: 76 IN | DIASTOLIC BLOOD PRESSURE: 60 MMHG

## 2023-06-07 PROCEDURE — 99214 OFFICE O/P EST MOD 30 MIN: CPT | Mod: 25

## 2023-06-07 PROCEDURE — 93000 ELECTROCARDIOGRAM COMPLETE: CPT

## 2023-06-07 RX ORDER — AMLODIPINE BESYLATE 5 MG/1
5 TABLET ORAL DAILY
Qty: 30 | Refills: 5 | Status: DISCONTINUED | COMMUNITY
Start: 2022-08-21 | End: 2023-06-07

## 2023-06-07 NOTE — ADDENDUM
[FreeTextEntry1] : I, Chris Elmore, assisted in documentation on 06/07/2023 acting as a scribe for Dr. Enrico Cortez.\par \par

## 2023-06-07 NOTE — DISCUSSION/SUMMARY
[EKG obtained to assist in diagnosis and management of assessed problem(s)] : EKG obtained to assist in diagnosis and management of assessed problem(s) [FreeTextEntry1] : Cardiomyopathy: The impression is cardiomyopathy. Currently, the condition is responding to treatment. The diagnostic plan includes ECG and echocardiogram. Medication management includes continuing beta blockers and increasing angiotensin receptor blockers. \par

## 2023-06-07 NOTE — ASSESSMENT
[FreeTextEntry1] : Cardiomyopathy in disease classified elsewhere (425.8) (I43)\par CHF (congestive heart failure) (428.0) (I50.9)\par Bilateral carotid bruits (785.9) (R09.89)\par Hypertension, renal disease (403.90,585.9) (I12.9)\par ESRD on HD\par )\par Third degree atrioventricular block (426.0) (I44.2)\par CHF (congestive heart failure) (428.0) (I50.9)\par \par Third degree atrioventricular block (426.0) (I44.2)\par CHF (congestive heart failure) (428.0) (I50.9)\par s/p ICD doing well\par s/p PPM epicardial lead \par . \par

## 2023-06-07 NOTE — HISTORY OF PRESENT ILLNESS
[FreeTextEntry1] : 11/7/22\par Patient is a 52-year-old black male with end-stage renal disease currently on hemodialysis via Shiley catheter who has a known history of a cardiomyopathy based on prior echoes and stress imaging. Patient has reported recent episodes of near syncope after dialysis. Holter monitoring was placed and revealed a high degree AV block especially in the morning hours with prolonged AV block lasting greater than 7.5 seconds they were's intermittent episodes of none sustained V. tach as well as SVT noted these episodes occur during the morning hours and the patient was presumably sleeping and had no symptoms of dizziness or syncope. Patient had been urged in the past to get a defibrillator for his reduced LV function but has declined. \par 12/16/22\par The patient c/o SOB for the past 3 to 4 days, described as mild in severity, worsening with exertion and relieved with rest. The patient denies associated chest pain, orthopnea, palpitations, leg edema, dizziness, diaphoresis, PND. The patient considers this a change in their clinical condition/status.\par seen by EPS will get leadless ICD followed by micra ppm in RV \par 1/27/23\par Denies Chest Pain, SOB, Dizziness, Leg edema, Orthopnea, PND, Palpitations, Syncope, VERNON, Diaphoresis.\par s/p ICD no chf on exam \par 04/14/23\par Denies Chest Pain, SOB, Dizziness, Leg edema, Orthopnea, PND, Palpitations, Syncope, VERNON, Diaphoresis.\par awaiting Micra PPM in RV \par bp after /80 \par has sob after 3 flights of steps no edema \par \par \par \par 06/07/23\par s/p admission to [facility] for PPM needed pericardial lead placed by CT surg\par Had lesadless ICD placed by EP Dr Gibson has periodic albin of BP to 95 w dizziness

## 2023-07-07 ENCOUNTER — APPOINTMENT (OUTPATIENT)
Dept: HEART AND VASCULAR | Facility: CLINIC | Age: 53
End: 2023-07-07

## 2023-08-09 ENCOUNTER — APPOINTMENT (OUTPATIENT)
Dept: HEART AND VASCULAR | Facility: CLINIC | Age: 53
End: 2023-08-09
Payer: MEDICARE

## 2023-08-09 VITALS — SYSTOLIC BLOOD PRESSURE: 110 MMHG | DIASTOLIC BLOOD PRESSURE: 74 MMHG

## 2023-08-09 VITALS
BODY MASS INDEX: 18.02 KG/M2 | HEART RATE: 80 BPM | SYSTOLIC BLOOD PRESSURE: 100 MMHG | WEIGHT: 148 LBS | DIASTOLIC BLOOD PRESSURE: 90 MMHG | HEIGHT: 76 IN

## 2023-08-09 DIAGNOSIS — I50.9 HEART FAILURE, UNSPECIFIED: ICD-10-CM

## 2023-08-09 PROCEDURE — 99214 OFFICE O/P EST MOD 30 MIN: CPT | Mod: 25

## 2023-08-09 PROCEDURE — 93306 TTE W/DOPPLER COMPLETE: CPT

## 2023-08-09 PROCEDURE — 93000 ELECTROCARDIOGRAM COMPLETE: CPT

## 2023-08-09 NOTE — ADDENDUM
[FreeTextEntry1] : I, Chris Elmore, assisted in documentation on 08/09/2023 acting as a scribe for Dr. Enrico Cortez.

## 2023-08-09 NOTE — PHYSICAL EXAM
[Well Developed] : well developed [Well Nourished] : well nourished [No Acute Distress] : no acute distress [Normal Conjunctiva] : normal conjunctiva [Normal Venous Pressure] : normal venous pressure [No Carotid Bruit] : no carotid bruit [5th Left ICS - MCL] : palpated at the 5th LICS in the midclavicular line [Normal] : normal [Rhythm Regular] : regular [Normal S1] : normal S1 [Normal S2] : normal S2 [Clear Lung Fields] : clear lung fields [Good Air Entry] : good air entry [No Respiratory Distress] : no respiratory distress  [Soft] : abdomen soft [Non Tender] : non-tender [No Masses/organomegaly] : no masses/organomegaly [Normal Bowel Sounds] : normal bowel sounds [Normal Gait] : normal gait [No Rash] : no rash [No Skin Lesions] : no skin lesions [Moves all extremities] : moves all extremities [No Focal Deficits] : no focal deficits [Normal Speech] : normal speech [Alert and Oriented] : alert and oriented [Normal memory] : normal memory [de-identified] : ICD in left flank w wire lead noted.  [de-identified] : LUE / RUE mod varicosities bilat.

## 2023-08-09 NOTE — HISTORY OF PRESENT ILLNESS
[FreeTextEntry1] : 11/7/22 Patient is a 52-year-old black male with end-stage renal disease currently on hemodialysis via Shiley catheter who has a known history of a cardiomyopathy based on prior echoes and stress imaging. Patient has reported recent episodes of near syncope after dialysis. Holter monitoring was placed and revealed a high degree AV block especially in the morning hours with prolonged AV block lasting greater than 7.5 seconds they were's intermittent episodes of none sustained V. tach as well as SVT noted these episodes occur during the morning hours and the patient was presumably sleeping and had no symptoms of dizziness or syncope. Patient had been urged in the past to get a defibrillator for his reduced LV function but has declined. 12/16/22 The patient c/o SOB for the past 3 to 4 days, described as mild in severity, worsening with exertion and relieved with rest. The patient denies associated chest pain, orthopnea, palpitations, leg edema, dizziness, diaphoresis, PND. The patient considers this a change in their clinical condition/status. seen by EPS will get leadless ICD followed by micra ppm in RV 1/27/23 Denies Chest Pain, SOB, Dizziness, Leg edema, Orthopnea, PND, Palpitations, Syncope, VERNON, Diaphoresis. s/p ICD no chf on exam 04/14/23 Denies Chest Pain, SOB, Dizziness, Leg edema, Orthopnea, PND, Palpitations, Syncope, VERNON, Diaphoresis. awaiting Micra PPM in RV bp after /80 has sob after 3 flights of steps no edema 06/07/23 s/p admission to [facility] for PPM needed pericardial lead placed by CT surg Had lesadless ICD placed by EP Dr Gibson has periodic drop of BP to 95 w dizziness 08/09/23 has weakness after HD  can tolerate 2-3 flights of stairs  wants to know if he can have sexual relations

## 2023-08-09 NOTE — DISCUSSION/SUMMARY
[EKG obtained to assist in diagnosis and management of assessed problem(s)] : EKG obtained to assist in diagnosis and management of assessed problem(s) [FreeTextEntry1] : Cardiomyopathy: The impression is cardiomyopathy. Currently, the condition is responding to treatment. The diagnostic plan includes ECG and echocardiogram. Medication management includes continuing beta blockers and increasing angiotensin receptor blockers.  PPM VS 99% < 1% V paced  i brief tachyarrhythmia noted

## 2023-08-09 NOTE — ASSESSMENT
[FreeTextEntry1] : ESRD on HD  s/p ICD doing well  s/p PPM epicardial lead  .Echo Lvef 20% thickened AV non stenotic  MV immobile PL MILD-MOD MS  2+ MR

## 2023-11-08 ENCOUNTER — NON-APPOINTMENT (OUTPATIENT)
Age: 53
End: 2023-11-08

## 2023-11-08 ENCOUNTER — APPOINTMENT (OUTPATIENT)
Dept: HEART AND VASCULAR | Facility: CLINIC | Age: 53
End: 2023-11-08
Payer: MEDICARE

## 2023-11-08 VITALS
WEIGHT: 155 LBS | HEART RATE: 69 BPM | HEIGHT: 76 IN | SYSTOLIC BLOOD PRESSURE: 112 MMHG | BODY MASS INDEX: 18.88 KG/M2 | DIASTOLIC BLOOD PRESSURE: 80 MMHG

## 2023-11-08 PROCEDURE — 93000 ELECTROCARDIOGRAM COMPLETE: CPT

## 2023-11-08 PROCEDURE — 99214 OFFICE O/P EST MOD 30 MIN: CPT

## 2024-01-10 ENCOUNTER — NON-APPOINTMENT (OUTPATIENT)
Age: 54
End: 2024-01-10

## 2024-01-10 ENCOUNTER — APPOINTMENT (OUTPATIENT)
Dept: HEART AND VASCULAR | Facility: CLINIC | Age: 54
End: 2024-01-10
Payer: MEDICARE

## 2024-01-10 VITALS
SYSTOLIC BLOOD PRESSURE: 110 MMHG | BODY MASS INDEX: 18.88 KG/M2 | WEIGHT: 155 LBS | DIASTOLIC BLOOD PRESSURE: 80 MMHG | HEIGHT: 76 IN | HEART RATE: 92 BPM

## 2024-01-10 DIAGNOSIS — I12.9 HYPERTENSIVE CHRONIC KIDNEY DISEASE WITH STAGE 1 THROUGH STAGE 4 CHRONIC KIDNEY DISEASE, OR UNSPECIFIED CHRONIC KIDNEY DISEASE: ICD-10-CM

## 2024-01-10 DIAGNOSIS — Z99.2 DEPENDENCE ON RENAL DIALYSIS: ICD-10-CM

## 2024-01-10 DIAGNOSIS — Z00.00 ENCOUNTER FOR GENERAL ADULT MEDICAL EXAMINATION W/OUT ABNORMAL FINDINGS: ICD-10-CM

## 2024-01-10 PROCEDURE — 93000 ELECTROCARDIOGRAM COMPLETE: CPT

## 2024-01-10 PROCEDURE — 99214 OFFICE O/P EST MOD 30 MIN: CPT

## 2024-01-10 PROCEDURE — G2211 COMPLEX E/M VISIT ADD ON: CPT

## 2024-01-10 RX ORDER — CARVEDILOL 25 MG/1
25 TABLET, FILM COATED ORAL
Qty: 180 | Refills: 3 | Status: ACTIVE | COMMUNITY
Start: 2022-08-21 | End: 1900-01-01

## 2024-01-10 NOTE — DISCUSSION/SUMMARY
[FreeTextEntry1] : Cardiomyopathy: The impression is cardiomyopathy. Currently, the condition is responding to treatment. The diagnostic plan includes ECG and echocardiogram. Medication management includes continuing beta blockers and increasing angiotensin receptor blockers. AFIB cintine BB and doac  meds reeviwed  [EKG obtained to assist in diagnosis and management of assessed problem(s)] : EKG obtained to assist in diagnosis and management of assessed problem(s)

## 2024-01-10 NOTE — HISTORY OF PRESENT ILLNESS
[FreeTextEntry1] : 11/7/22 Patient is a 52-year-old black male with end-stage renal disease currently on hemodialysis via Shiley catheter who has a known history of a cardiomyopathy based on prior echoes and stress imaging. Patient has reported recent episodes of near syncope after dialysis. Holter monitoring was placed and revealed a high degree AV block especially in the morning hours with prolonged AV block lasting greater than 7.5 seconds they were's intermittent episodes of none sustained V. tach as well as SVT noted these episodes occur during the morning hours and the patient was presumably sleeping and had no symptoms of dizziness or syncope. Patient had been urged in the past to get a defibrillator for his reduced LV function but has declined. 12/16/22 The patient c/o SOB for the past 3 to 4 days, described as mild in severity, worsening with exertion and relieved with rest. The patient denies associated chest pain, orthopnea, palpitations, leg edema, dizziness, diaphoresis, PND. The patient considers this a change in their clinical condition/status. seen by EPS will get leadless ICD followed by micra ppm in RV 1/27/23 Denies Chest Pain, SOB, Dizziness, Leg edema, Orthopnea, PND, Palpitations, Syncope, VERNON, Diaphoresis. s/p ICD no chf on exam 04/14/23 Denies Chest Pain, SOB, Dizziness, Leg edema, Orthopnea, PND, Palpitations, Syncope, VERNON, Diaphoresis. awaiting Micra PPM in RV bp after /80 has sob after 3 flights of steps no edema 06/07/23 s/p admission to [facility] for PPM needed pericardial lead placed by CT surg Had lesadless ICD placed by EP Dr Gibson has periodic drop of BP to 95 w dizziness 08/09/23 has weakness after HD  can tolerate 2-3 flights of stairs  wants to know if he can have sexual relations  11/8/23 mild sob at bedtime mild wgt gain  ]no edema  no effort dyspnea  has heard beeping from icd  1/10/24 Cardiomyopathy  w afib and esrd  s/p ICD and PPM  tolerating HD . walking is well tolerated has dyspnea at 3 flights of stairs  mild dizziness after HD  wgt stable at 155 no edema noted fair appetite

## 2024-01-10 NOTE — PHYSICAL EXAM
[Well Developed] : well developed [Well Nourished] : well nourished [No Acute Distress] : no acute distress [Normal Conjunctiva] : normal conjunctiva [Normal Venous Pressure] : normal venous pressure [No Carotid Bruit] : no carotid bruit [5th Left ICS - MCL] : palpated at the 5th LICS in the midclavicular line [Normal] : normal [Rhythm Regular] : regular [Normal S1] : normal S1 [Normal S2] : normal S2 [Clear Lung Fields] : clear lung fields [Good Air Entry] : good air entry [No Respiratory Distress] : no respiratory distress  [Soft] : abdomen soft [Non Tender] : non-tender [No Masses/organomegaly] : no masses/organomegaly [Normal Bowel Sounds] : normal bowel sounds [Normal Gait] : normal gait [No Rash] : no rash [No Skin Lesions] : no skin lesions [Moves all extremities] : moves all extremities [No Focal Deficits] : no focal deficits [Normal Speech] : normal speech [Alert and Oriented] : alert and oriented [Normal memory] : normal memory [de-identified] : ICD in left flank w wire lead noted.  [de-identified] : LUE / RUE mod varicosities bilat.

## 2024-01-11 RX ORDER — APIXABAN 2.5 MG/1
2.5 TABLET, FILM COATED ORAL
Qty: 180 | Refills: 3 | Status: ACTIVE | COMMUNITY
Start: 2022-10-31 | End: 1900-01-01

## 2024-03-19 RX ORDER — SACUBITRIL AND VALSARTAN 49; 51 MG/1; MG/1
49-51 TABLET, FILM COATED ORAL
Qty: 180 | Refills: 3 | Status: ACTIVE | COMMUNITY
Start: 2023-04-14 | End: 1900-01-01

## 2024-04-03 ENCOUNTER — NON-APPOINTMENT (OUTPATIENT)
Age: 54
End: 2024-04-03

## 2024-04-03 ENCOUNTER — APPOINTMENT (OUTPATIENT)
Dept: HEART AND VASCULAR | Facility: CLINIC | Age: 54
End: 2024-04-03
Payer: MEDICARE

## 2024-04-03 VITALS
BODY MASS INDEX: 19.12 KG/M2 | HEART RATE: 63 BPM | WEIGHT: 157 LBS | HEIGHT: 76 IN | DIASTOLIC BLOOD PRESSURE: 78 MMHG | SYSTOLIC BLOOD PRESSURE: 110 MMHG

## 2024-04-03 DIAGNOSIS — I44.2 ATRIOVENTRICULAR BLOCK, COMPLETE: ICD-10-CM

## 2024-04-03 DIAGNOSIS — I43 CARDIOMYOPATHY IN DISEASES CLASSIFIED ELSEWHERE: ICD-10-CM

## 2024-04-03 PROCEDURE — 99214 OFFICE O/P EST MOD 30 MIN: CPT | Mod: 25

## 2024-04-03 PROCEDURE — 93000 ELECTROCARDIOGRAM COMPLETE: CPT

## 2024-04-03 PROCEDURE — G2211 COMPLEX E/M VISIT ADD ON: CPT | Mod: NC,1L

## 2024-04-03 NOTE — HISTORY OF PRESENT ILLNESS
[FreeTextEntry1] : 11/7/22 Patient is a 52-year-old black male with end-stage renal disease currently on hemodialysis via Shiley catheter who has a known history of a cardiomyopathy based on prior echoes and stress imaging. Patient has reported recent episodes of near syncope after dialysis. Holter monitoring was placed and revealed a high degree AV block especially in the morning hours with prolonged AV block lasting greater than 7.5 seconds they were's intermittent episodes of none sustained V. tach as well as SVT noted these episodes occur during the morning hours and the patient was presumably sleeping and had no symptoms of dizziness or syncope. Patient had been urged in the past to get a defibrillator for his reduced LV function but has declined.  12/16/22 The patient c/o SOB for the past 3 to 4 days, described as mild in severity, worsening with exertion and relieved with rest. The patient denies associated chest pain, orthopnea, palpitations, leg edema, dizziness, diaphoresis, PND. The patient considers this a change in their clinical condition/status. seen by EPS will get leadless ICD followed by micra ppm in RV  1/27/23 Denies Chest Pain, SOB, Dizziness, Leg edema, Orthopnea, PND, Palpitations, Syncope, VERNON, Diaphoresis. s/p ICD no chf on exam  04/14/23 Denies Chest Pain, SOB, Dizziness, Leg edema, Orthopnea, PND, Palpitations, Syncope, VERNON, Diaphoresis. awaiting Micra PPM in RV bp after /80 has sob after 3 flights of steps no edema  06/07/23 s/p admission to [facility] for PPM needed pericardial lead placed by CT surg Had lesadless ICD placed by EP Dr Gibson has periodic drop of BP to 95 w dizziness  08/09/23 has weakness after HD  can tolerate 2-3 flights of stairs  wants to know if he can have sexual relations   11/8/23 mild sob at bedtime mild wgt gain  ]no edema  no effort dyspnea  has heard beeping from icd   1/10/24 Cardiomyopathy  w afib and esrd  s/p ICD and PPM  tolerating HD . walking is well tolerated has dyspnea at 3 flights of stairs  mild dizziness after HD  wgt stable at 155 no edema noted fair appetite   4/2/24 feels weak after HD weak and tired  Bp after -130 sys  periodic post prandial emesis will se DR Gaston of GI  Has EP appt May 13 for ICD eval  good effort capacity no sob or vernon no edema

## 2024-04-03 NOTE — PHYSICAL EXAM
[Well Developed] : well developed [Well Nourished] : well nourished [No Acute Distress] : no acute distress [Normal Conjunctiva] : normal conjunctiva [Normal Venous Pressure] : normal venous pressure [No Carotid Bruit] : no carotid bruit [5th Left ICS - MCL] : palpated at the 5th LICS in the midclavicular line [Normal] : normal [Normal S1] : normal S1 [Rhythm Regular] : regular [Normal S2] : normal S2 [Clear Lung Fields] : clear lung fields [Good Air Entry] : good air entry [No Respiratory Distress] : no respiratory distress  [Non Tender] : non-tender [Soft] : abdomen soft [No Masses/organomegaly] : no masses/organomegaly [Normal Bowel Sounds] : normal bowel sounds [Normal Gait] : normal gait [No Rash] : no rash [No Skin Lesions] : no skin lesions [Moves all extremities] : moves all extremities [No Focal Deficits] : no focal deficits [Normal Speech] : normal speech [Alert and Oriented] : alert and oriented [Normal memory] : normal memory [de-identified] : ICD in left flank w wire lead noted.  [de-identified] : LUE / RUE mod varicosities bilat.

## 2024-04-03 NOTE — DISCUSSION/SUMMARY
[Patient] : the patient [FreeTextEntry1] : Cardiomyopathy: The impression is cardiomyopathy. Currently, the condition is responding to treatment. The diagnostic plan includes ECG and echocardiogram. Medication management includes continuing beta blockers and increasing angiotensin receptor blockers. AFIB cintine BB and doac  meds reeviwed  [EKG obtained to assist in diagnosis and management of assessed problem(s)] : EKG obtained to assist in diagnosis and management of assessed problem(s)

## 2024-06-25 DIAGNOSIS — F32.89 OTHER SPECIFIED DEPRESSIVE EPISODES: ICD-10-CM

## 2024-06-25 RX ORDER — MIRTAZAPINE 30 MG/1
30 TABLET, FILM COATED ORAL AT BEDTIME
Qty: 30 | Refills: 3 | Status: ACTIVE | COMMUNITY
Start: 2024-06-25 | End: 1900-01-01

## 2024-08-07 ENCOUNTER — APPOINTMENT (OUTPATIENT)
Dept: HEART AND VASCULAR | Facility: CLINIC | Age: 54
End: 2024-08-07

## 2024-08-14 ENCOUNTER — APPOINTMENT (OUTPATIENT)
Dept: HEART AND VASCULAR | Facility: CLINIC | Age: 54
End: 2024-08-14
Payer: MEDICARE

## 2024-08-14 ENCOUNTER — NON-APPOINTMENT (OUTPATIENT)
Age: 54
End: 2024-08-14

## 2024-08-14 VITALS
DIASTOLIC BLOOD PRESSURE: 90 MMHG | BODY MASS INDEX: 18.75 KG/M2 | HEART RATE: 73 BPM | WEIGHT: 154 LBS | SYSTOLIC BLOOD PRESSURE: 138 MMHG | HEIGHT: 76 IN

## 2024-08-14 DIAGNOSIS — I43 CARDIOMYOPATHY IN DISEASES CLASSIFIED ELSEWHERE: ICD-10-CM

## 2024-08-14 DIAGNOSIS — Z00.00 ENCOUNTER FOR GENERAL ADULT MEDICAL EXAMINATION W/OUT ABNORMAL FINDINGS: ICD-10-CM

## 2024-08-14 PROCEDURE — 99214 OFFICE O/P EST MOD 30 MIN: CPT | Mod: 25

## 2024-08-14 PROCEDURE — 93000 ELECTROCARDIOGRAM COMPLETE: CPT

## 2024-08-14 PROCEDURE — G2211 COMPLEX E/M VISIT ADD ON: CPT | Mod: NC

## 2024-08-14 NOTE — ADDENDUM
[FreeTextEntry1] : Damian Jones assisted in documentation on 08/14/24 acting as a scribe for Dr. Enrico Cortez.

## 2024-08-14 NOTE — PHYSICAL EXAM
[Well Developed] : well developed [Well Nourished] : well nourished [No Acute Distress] : no acute distress [Normal Conjunctiva] : normal conjunctiva [Normal Venous Pressure] : normal venous pressure [No Carotid Bruit] : no carotid bruit [5th Left ICS - MCL] : palpated at the 5th LICS in the midclavicular line [Normal] : normal [Rhythm Regular] : regular [Normal S1] : normal S1 [Normal S2] : normal S2 [Clear Lung Fields] : clear lung fields [Good Air Entry] : good air entry [No Respiratory Distress] : no respiratory distress  [Soft] : abdomen soft [Non Tender] : non-tender [No Masses/organomegaly] : no masses/organomegaly [Normal Bowel Sounds] : normal bowel sounds [Normal Gait] : normal gait [No Rash] : no rash [No Skin Lesions] : no skin lesions [Moves all extremities] : moves all extremities [No Focal Deficits] : no focal deficits [Normal Speech] : normal speech [Alert and Oriented] : alert and oriented [Normal memory] : normal memory [de-identified] : ICD in left flank w wire lead noted.  [de-identified] : LUE / RUE mod varicosities bilat.   pos thrill LUE

## 2024-08-14 NOTE — HISTORY OF PRESENT ILLNESS
[FreeTextEntry1] : 11/7/22 Patient is a 52-year-old black male with end-stage renal disease currently on hemodialysis via Shiley catheter who has a known history of a cardiomyopathy based on prior echoes and stress imaging. Patient has reported recent episodes of near syncope after dialysis. Holter monitoring was placed and revealed a high degree AV block especially in the morning hours with prolonged AV block lasting greater than 7.5 seconds they were's intermittent episodes of none sustained V. tach as well as SVT noted these episodes occur during the morning hours and the patient was presumably sleeping and had no symptoms of dizziness or syncope. Patient had been urged in the past to get a defibrillator for his reduced LV function but has declined.  12/16/22 The patient c/o SOB for the past 3 to 4 days, described as mild in severity, worsening with exertion and relieved with rest. The patient denies associated chest pain, orthopnea, palpitations, leg edema, dizziness, diaphoresis, PND. The patient considers this a change in their clinical condition/status. seen by EPS will get leadless ICD followed by micra ppm in RV  1/27/23 Denies Chest Pain, SOB, Dizziness, Leg edema, Orthopnea, PND, Palpitations, Syncope, VERNON, Diaphoresis. s/p ICD no chf on exam  04/14/23 Denies Chest Pain, SOB, Dizziness, Leg edema, Orthopnea, PND, Palpitations, Syncope, VERNON, Diaphoresis. awaiting Micra PPM in RV bp after /80 has sob after 3 flights of steps no edema  06/07/23 s/p admission to [facility] for PPM needed pericardial lead placed by CT surg Had lesadless ICD placed by EP Dr Gibson has periodic drop of BP to 95 w dizziness  08/09/23 has weakness after HD  can tolerate 2-3 flights of stairs  wants to know if he can have sexual relations   11/8/23 mild sob at bedtime mild wgt gain  ]no edema  no effort dyspnea  has heard beeping from icd   1/10/24 Cardiomyopathy  w afib and esrd  s/p ICD and PPM  tolerating HD . walking is well tolerated has dyspnea at 3 flights of stairs  mild dizziness after HD  wgt stable at 155 no edema noted fair appetite   4/2/24 feels weak after HD weak and tired  Bp after -130 sys  periodic post prandial emesis will se DR Gaston of GI  Has EP appt May 13 for ICD eval  good effort capacity no sob or vernon no edema   08/14/24 tolerating HD no sob or edema no HA or sscp  on transplant list  no arrhythmia or palpitation or ICD d/c  wgt stable no drop in BP on HD

## 2024-09-25 ENCOUNTER — APPOINTMENT (OUTPATIENT)
Dept: HEART AND VASCULAR | Facility: CLINIC | Age: 54
End: 2024-09-25

## 2024-09-25 VITALS
SYSTOLIC BLOOD PRESSURE: 140 MMHG | WEIGHT: 156 LBS | BODY MASS INDEX: 19 KG/M2 | HEIGHT: 76 IN | DIASTOLIC BLOOD PRESSURE: 100 MMHG

## 2024-09-25 DIAGNOSIS — I50.9 HEART FAILURE, UNSPECIFIED: ICD-10-CM

## 2024-09-25 DIAGNOSIS — Z99.2 DEPENDENCE ON RENAL DIALYSIS: ICD-10-CM

## 2024-09-25 PROCEDURE — 93306 TTE W/DOPPLER COMPLETE: CPT

## 2024-09-25 PROCEDURE — 99214 OFFICE O/P EST MOD 30 MIN: CPT | Mod: 25

## 2024-09-25 PROCEDURE — 93000 ELECTROCARDIOGRAM COMPLETE: CPT | Mod: XU

## 2024-09-25 PROCEDURE — 93288 INTERROG EVL PM/LDLS PM IP: CPT

## 2024-09-30 NOTE — HISTORY OF PRESENT ILLNESS
[FreeTextEntry1] : 11/7/22 Patient is a 52-year-old black male with end-stage renal disease currently on hemodialysis via Shiley catheter who has a known history of a cardiomyopathy based on prior echoes and stress imaging. Patient has reported recent episodes of near syncope after dialysis. Holter monitoring was placed and revealed a high degree AV block especially in the morning hours with prolonged AV block lasting greater than 7.5 seconds they were's intermittent episodes of none sustained V. tach as well as SVT noted these episodes occur during the morning hours and the patient was presumably sleeping and had no symptoms of dizziness or syncope. Patient had been urged in the past to get a defibrillator for his reduced LV function but has declined.  12/16/22 The patient c/o SOB for the past 3 to 4 days, described as mild in severity, worsening with exertion and relieved with rest. The patient denies associated chest pain, orthopnea, palpitations, leg edema, dizziness, diaphoresis, PND. The patient considers this a change in their clinical condition/status. seen by EPS will get leadless ICD followed by micra ppm in RV  1/27/23 Denies Chest Pain, SOB, Dizziness, Leg edema, Orthopnea, PND, Palpitations, Syncope, VERNON, Diaphoresis. s/p ICD no chf on exam  04/14/23 Denies Chest Pain, SOB, Dizziness, Leg edema, Orthopnea, PND, Palpitations, Syncope, VERNON, Diaphoresis. awaiting Micra PPM in RV bp after /80 has sob after 3 flights of steps no edema  06/07/23 s/p admission to [facility] for PPM needed pericardial lead placed by CT surg Had lesadless ICD placed by EP Dr Gibson has periodic drop of BP to 95 w dizziness  08/09/23 has weakness after HD  can tolerate 2-3 flights of stairs  wants to know if he can have sexual relations   11/8/23 mild sob at bedtime mild wgt gain  ]no edema  no effort dyspnea  has heard beeping from icd   1/10/24 Cardiomyopathy  w afib and esrd  s/p ICD and PPM  tolerating HD . walking is well tolerated has dyspnea at 3 flights of stairs  mild dizziness after HD  wgt stable at 155 no edema noted fair appetite   4/2/24 feels weak after HD weak and tired  Bp after -130 sys  periodic post prandial emesis will se DR Gaston of GI  Has EP appt May 13 for ICD eval  good effort capacity no sob or vernon no edema   08/14/24 tolerating HD no sob or edema no HA or sscp  on transplant list  no arrhythmia or palpitation or ICD d/c  wgt stable no drop in BP on HD   09/2524 feels skipped beat at nite no sob or edema has PPM for av block   icd for cardiomyopayhy

## 2024-09-30 NOTE — HISTORY OF PRESENT ILLNESS
[FreeTextEntry1] : 11/7/22 Patient is a 52-year-old black male with end-stage renal disease currently on hemodialysis via Shiley catheter who has a known history of a cardiomyopathy based on prior echoes and stress imaging. Patient has reported recent episodes of near syncope after dialysis. Holter monitoring was placed and revealed a high degree AV block especially in the morning hours with prolonged AV block lasting greater than 7.5 seconds they were's intermittent episodes of none sustained V. tach as well as SVT noted these episodes occur during the morning hours and the patient was presumably sleeping and had no symptoms of dizziness or syncope. Patient had been urged in the past to get a defibrillator for his reduced LV function but has declined.  12/16/22 The patient c/o SOB for the past 3 to 4 days, described as mild in severity, worsening with exertion and relieved with rest. The patient denies associated chest pain, orthopnea, palpitations, leg edema, dizziness, diaphoresis, PND. The patient considers this a change in their clinical condition/status. seen by EPS will get leadless ICD followed by micra ppm in RV  1/27/23 Denies Chest Pain, SOB, Dizziness, Leg edema, Orthopnea, PND, Palpitations, Syncope, VERNON, Diaphoresis. s/p ICD no chf on exam  04/14/23 Denies Chest Pain, SOB, Dizziness, Leg edema, Orthopnea, PND, Palpitations, Syncope, VERNON, Diaphoresis. awaiting Micra PPM in RV bp after /80 has sob after 3 flights of steps no edema  06/07/23 s/p admission to [facility] for PPM needed pericardial lead placed by CT surg Had lesadless ICD placed by EP Dr Gibson has periodic drop of BP to 95 w dizziness  08/09/23 has weakness after HD  can tolerate 2-3 flights of stairs  wants to know if he can have sexual relations   11/8/23 mild sob at bedtime mild wgt gain  ]no edema  no effort dyspnea  has heard beeping from icd   1/10/24 Cardiomyopathy  w afib and esrd  s/p ICD and PPM  tolerating HD . walking is well tolerated has dyspnea at 3 flights of stairs  mild dizziness after HD  wgt stable at 155 no edema noted fair appetite   4/2/24 feels weak after HD weak and tired  Bp after -130 sys  periodic post prandial emesis will se DR Gaston of GI  Has EP appt May 13 for ICD eval  good effort capacity no sob or vernon no edema   08/14/24 tolerating HD no sob or edema no HA or sscp  on transplant list  no arrhythmia or palpitation or ICD d/c  wgt stable no drop in BP on HD   09/2524 feels skipped beat at nite no sob or edema has PPM for av block   icd for cardiomyopayhy

## 2024-09-30 NOTE — PHYSICAL EXAM
[Well Developed] : well developed [Well Nourished] : well nourished [No Acute Distress] : no acute distress [Normal Conjunctiva] : normal conjunctiva [Normal Venous Pressure] : normal venous pressure [No Carotid Bruit] : no carotid bruit [5th Left ICS - MCL] : palpated at the 5th LICS in the midclavicular line [Normal] : normal [Rhythm Regular] : regular [Normal S1] : normal S1 [Normal S2] : normal S2 [Clear Lung Fields] : clear lung fields [Good Air Entry] : good air entry [No Respiratory Distress] : no respiratory distress  [Soft] : abdomen soft [Non Tender] : non-tender [No Masses/organomegaly] : no masses/organomegaly [Normal Bowel Sounds] : normal bowel sounds [Normal Gait] : normal gait [No Rash] : no rash [No Skin Lesions] : no skin lesions [Moves all extremities] : moves all extremities [No Focal Deficits] : no focal deficits [Normal Speech] : normal speech [Alert and Oriented] : alert and oriented [Normal memory] : normal memory [de-identified] : ICD in left flank w wire lead noted.  [de-identified] : LUE / RUE mod varicosities bilat.   pos thrill LUE

## 2024-09-30 NOTE — HISTORY OF PRESENT ILLNESS
[FreeTextEntry1] : 11/7/22 Patient is a 52-year-old black male with end-stage renal disease currently on hemodialysis via Shiley catheter who has a known history of a cardiomyopathy based on prior echoes and stress imaging. Patient has reported recent episodes of near syncope after dialysis. Holter monitoring was placed and revealed a high degree AV block especially in the morning hours with prolonged AV block lasting greater than 7.5 seconds they were's intermittent episodes of none sustained V. tach as well as SVT noted these episodes occur during the morning hours and the patient was presumably sleeping and had no symptoms of dizziness or syncope. Patient had been urged in the past to get a defibrillator for his reduced LV function but has declined.  12/16/22 The patient c/o SOB for the past 3 to 4 days, described as mild in severity, worsening with exertion and relieved with rest. The patient denies associated chest pain, orthopnea, palpitations, leg edema, dizziness, diaphoresis, PND. The patient considers this a change in their clinical condition/status. seen by EPS will get leadless ICD followed by micra ppm in RV  1/27/23 Denies Chest Pain, SOB, Dizziness, Leg edema, Orthopnea, PND, Palpitations, Syncope, VERNON, Diaphoresis. s/p ICD no chf on exam  04/14/23 Denies Chest Pain, SOB, Dizziness, Leg edema, Orthopnea, PND, Palpitations, Syncope, VERNON, Diaphoresis. awaiting Micra PPM in RV bp after /80 has sob after 3 flights of steps no edema  06/07/23 s/p admission to [facility] for PPM needed pericardial lead placed by CT surg Had lesadless ICD placed by EP Dr Gisbon has periodic drop of BP to 95 w dizziness  08/09/23 has weakness after HD  can tolerate 2-3 flights of stairs  wants to know if he can have sexual relations   11/8/23 mild sob at bedtime mild wgt gain  ]no edema  no effort dyspnea  has heard beeping from icd   1/10/24 Cardiomyopathy  w afib and esrd  s/p ICD and PPM  tolerating HD . walking is well tolerated has dyspnea at 3 flights of stairs  mild dizziness after HD  wgt stable at 155 no edema noted fair appetite   4/2/24 feels weak after HD weak and tired  Bp after -130 sys  periodic post prandial emesis will se DR Gaston of GI  Has EP appt May 13 for ICD eval  good effort capacity no sob or vernon no edema   08/14/24 tolerating HD no sob or edema no HA or sscp  on transplant list  no arrhythmia or palpitation or ICD d/c  wgt stable no drop in BP on HD   09/2524 feels skipped beat at nite no sob or edema has PPM for av block   icd for cardiomyopayhy

## 2024-09-30 NOTE — PHYSICAL EXAM
[Well Developed] : well developed [Well Nourished] : well nourished [No Acute Distress] : no acute distress [Normal Conjunctiva] : normal conjunctiva [Normal Venous Pressure] : normal venous pressure [No Carotid Bruit] : no carotid bruit [5th Left ICS - MCL] : palpated at the 5th LICS in the midclavicular line [Normal] : normal [Rhythm Regular] : regular [Normal S1] : normal S1 [Normal S2] : normal S2 [Clear Lung Fields] : clear lung fields [Good Air Entry] : good air entry [No Respiratory Distress] : no respiratory distress  [Soft] : abdomen soft [Non Tender] : non-tender [No Masses/organomegaly] : no masses/organomegaly [Normal Bowel Sounds] : normal bowel sounds [Normal Gait] : normal gait [No Rash] : no rash [No Skin Lesions] : no skin lesions [Moves all extremities] : moves all extremities [No Focal Deficits] : no focal deficits [Normal Speech] : normal speech [Alert and Oriented] : alert and oriented [Normal memory] : normal memory [de-identified] : ICD in left flank w wire lead noted.  [de-identified] : LUE / RUE mod varicosities bilat.   pos thrill LUE

## 2024-09-30 NOTE — ADDENDUM
[FreeTextEntry1] : Qian Marsh assisted in documentation on Sep 25, 2024, 4:40PM acting as a scribe for Dr. Enrico Cortez

## 2024-09-30 NOTE — PHYSICAL EXAM
[Well Developed] : well developed [Well Nourished] : well nourished [No Acute Distress] : no acute distress [Normal Conjunctiva] : normal conjunctiva [Normal Venous Pressure] : normal venous pressure [No Carotid Bruit] : no carotid bruit [5th Left ICS - MCL] : palpated at the 5th LICS in the midclavicular line [Normal] : normal [Rhythm Regular] : regular [Normal S1] : normal S1 [Normal S2] : normal S2 [Clear Lung Fields] : clear lung fields [Good Air Entry] : good air entry [No Respiratory Distress] : no respiratory distress  [Soft] : abdomen soft [Non Tender] : non-tender [No Masses/organomegaly] : no masses/organomegaly [Normal Bowel Sounds] : normal bowel sounds [Normal Gait] : normal gait [No Rash] : no rash [No Skin Lesions] : no skin lesions [Moves all extremities] : moves all extremities [No Focal Deficits] : no focal deficits [Normal Speech] : normal speech [Alert and Oriented] : alert and oriented [Normal memory] : normal memory [de-identified] : ICD in left flank w wire lead noted.  [de-identified] : LUE / RUE mod varicosities bilat.   pos thrill LUE

## 2024-09-30 NOTE — DISCUSSION/SUMMARY
[Patient] : the patient [FreeTextEntry1] : Cardiomyopathy: The impression is cardiomyopathy. Currently, the condition is responding to treatment. The diagnostic plan includes ECG and echocardiogram. Medication management includes continuing beta blockers and increasing angiotensin receptor blockers. AFIB cintine BB and doac  meds reeviwed  PPMK no high rates noted  chf compnesated  [EKG obtained to assist in diagnosis and management of assessed problem(s)] : EKG obtained to assist in diagnosis and management of assessed problem(s)

## 2024-12-17 ENCOUNTER — NON-APPOINTMENT (OUTPATIENT)
Age: 54
End: 2024-12-17

## 2024-12-30 ENCOUNTER — NON-APPOINTMENT (OUTPATIENT)
Age: 54
End: 2024-12-30

## 2024-12-30 ENCOUNTER — APPOINTMENT (OUTPATIENT)
Dept: HEART AND VASCULAR | Facility: CLINIC | Age: 54
End: 2024-12-30
Payer: MEDICARE

## 2024-12-30 VITALS
BODY MASS INDEX: 18.88 KG/M2 | HEART RATE: 74 BPM | RESPIRATION RATE: 12 BRPM | SYSTOLIC BLOOD PRESSURE: 138 MMHG | HEIGHT: 76 IN | DIASTOLIC BLOOD PRESSURE: 68 MMHG | WEIGHT: 155 LBS

## 2024-12-30 DIAGNOSIS — R09.89 OTHER SPECIFIED SYMPTOMS AND SIGNS INVOLVING THE CIRCULATORY AND RESPIRATORY SYSTEMS: ICD-10-CM

## 2024-12-30 PROCEDURE — 99214 OFFICE O/P EST MOD 30 MIN: CPT | Mod: 25

## 2024-12-30 PROCEDURE — 93000 ELECTROCARDIOGRAM COMPLETE: CPT

## 2025-01-15 ENCOUNTER — APPOINTMENT (OUTPATIENT)
Dept: HEART AND VASCULAR | Facility: CLINIC | Age: 55
End: 2025-01-15

## 2025-03-03 ENCOUNTER — NON-APPOINTMENT (OUTPATIENT)
Age: 55
End: 2025-03-03

## 2025-03-03 ENCOUNTER — APPOINTMENT (OUTPATIENT)
Dept: HEART AND VASCULAR | Facility: CLINIC | Age: 55
End: 2025-03-03
Payer: MEDICARE

## 2025-03-03 VITALS
BODY MASS INDEX: 18.75 KG/M2 | HEART RATE: 69 BPM | SYSTOLIC BLOOD PRESSURE: 160 MMHG | HEIGHT: 76 IN | RESPIRATION RATE: 12 BRPM | WEIGHT: 154 LBS | DIASTOLIC BLOOD PRESSURE: 100 MMHG

## 2025-03-03 VITALS — DIASTOLIC BLOOD PRESSURE: 80 MMHG | SYSTOLIC BLOOD PRESSURE: 140 MMHG

## 2025-03-03 DIAGNOSIS — I50.9 HEART FAILURE, UNSPECIFIED: ICD-10-CM

## 2025-03-03 PROCEDURE — 93000 ELECTROCARDIOGRAM COMPLETE: CPT

## 2025-03-03 PROCEDURE — 99214 OFFICE O/P EST MOD 30 MIN: CPT | Mod: 25

## 2025-03-03 RX ORDER — TRAZODONE HYDROCHLORIDE 50 MG/1
50 TABLET ORAL
Qty: 30 | Refills: 3 | Status: ACTIVE | COMMUNITY
Start: 2025-03-03 | End: 1900-01-01

## 2025-03-05 RX ORDER — VERICIGUAT 5 MG/1
5 TABLET, FILM COATED ORAL
Qty: 30 | Refills: 0 | Status: ACTIVE | COMMUNITY
Start: 2025-02-26

## 2025-03-21 ENCOUNTER — RX RENEWAL (OUTPATIENT)
Age: 55
End: 2025-03-21

## 2025-05-14 ENCOUNTER — APPOINTMENT (OUTPATIENT)
Dept: HEART AND VASCULAR | Facility: CLINIC | Age: 55
End: 2025-05-14
Payer: MEDICARE

## 2025-05-14 ENCOUNTER — NON-APPOINTMENT (OUTPATIENT)
Age: 55
End: 2025-05-14

## 2025-05-14 VITALS
BODY MASS INDEX: 18.88 KG/M2 | HEIGHT: 76 IN | WEIGHT: 155 LBS | DIASTOLIC BLOOD PRESSURE: 92 MMHG | HEART RATE: 70 BPM | SYSTOLIC BLOOD PRESSURE: 148 MMHG

## 2025-05-14 DIAGNOSIS — I50.9 HEART FAILURE, UNSPECIFIED: ICD-10-CM

## 2025-05-14 PROCEDURE — 99214 OFFICE O/P EST MOD 30 MIN: CPT | Mod: 25

## 2025-05-14 PROCEDURE — 93000 ELECTROCARDIOGRAM COMPLETE: CPT

## 2025-07-16 ENCOUNTER — APPOINTMENT (OUTPATIENT)
Dept: HEART AND VASCULAR | Facility: CLINIC | Age: 55
End: 2025-07-16